# Patient Record
Sex: FEMALE
[De-identification: names, ages, dates, MRNs, and addresses within clinical notes are randomized per-mention and may not be internally consistent; named-entity substitution may affect disease eponyms.]

---

## 2018-05-09 ENCOUNTER — HOSPITAL ENCOUNTER (EMERGENCY)
Dept: HOSPITAL 14 - H.ER | Age: 43
Discharge: HOME | End: 2018-05-09
Payer: SELF-PAY

## 2018-05-09 VITALS
TEMPERATURE: 98 F | SYSTOLIC BLOOD PRESSURE: 118 MMHG | HEART RATE: 78 BPM | OXYGEN SATURATION: 100 % | DIASTOLIC BLOOD PRESSURE: 67 MMHG

## 2018-05-09 VITALS — RESPIRATION RATE: 18 BRPM

## 2018-05-09 DIAGNOSIS — K64.8: ICD-10-CM

## 2018-05-09 DIAGNOSIS — K59.00: Primary | ICD-10-CM

## 2018-05-09 NOTE — ED PDOC
HPI: General Adult


Time Seen by Provider: 05/09/18 14:14


Chief Complaint (Nursing): GI Problem


Chief Complaint (Provider): Rectal pain x 2 days 


History Per: Patient


History/Exam Limitations: no limitations


Onset/Duration Of Symptoms: Days


Have you had recent travel within the past 21 days to any of the following 

countries: [place], [place], [place] or [place]?: No


Current Symptoms Are (Timing): Still Present


Additional Complaint(s): 





44 yo female with history of constipation presents with rectal pain. PT states 

she had 2 BM in 10 days associated with abdominal discomfort. PT states both 

the constipation and abdominal discomfort is normal for her. PT states she 

comes to the ER for evaluation because of rectal pain. Pt states she feels 

burning in rectal area. No similar in the past. 





Past Medical History


Reviewed: Historical Data, Nursing Documentation, Vital Signs


Vital Signs: 





 Last Vital Signs











Temp  97.0 F L  05/09/18 14:02


 


Pulse  75   05/09/18 14:02


 


Resp  18   05/09/18 14:02


 


BP  107/70   05/09/18 14:02


 


Pulse Ox  99   05/09/18 14:02














- Medical History


PMH: No Chronic Diseases





- Surgical History


Surgical History: No Surg Hx





- Family History


Family History: States: No Known Family Hx





- Living Arrangements


Living Arrangements: With Family





- Home Medications


Home Medications: 


 Ambulatory Orders











 Medication  Instructions  Recorded


 


Docusate [Colace] 100 mg PO Q12H PRN #10 cap 05/09/18


 


Hydrocortisone 2.5% (Rectal) 30 applic MD BID #1 tube 05/09/18





[Anusol-HC]  














- Allergies


Allergies/Adverse Reactions: 


 Allergies











Allergy/AdvReac Type Severity Reaction Status Date / Time


 


No Known Allergies Allergy   Verified 05/09/18 14:02














Review of Systems


ROS Statement: Except As Marked, All Systems Reviewed And Found Negative


Constitutional: Negative for: Fever, Chills


Gastrointestinal: Positive for: Abdominal Pain, Constipation, Rectal Pain





Physical Exam





- Reviewed


Nursing Documentation Reviewed: Yes


Vital Signs Reviewed: Yes





- Physical Exam


Appears: Positive for: Well, Non-toxic, No Acute Distress


Head Exam: Positive for: ATRAUMATIC, NORMAL INSPECTION, NORMOCEPHALIC


Skin: Positive for: Normal Color, Warm, DRY


Eye Exam: Positive for: Normal appearance


ENT: Positive for: Normal ENT Inspection


Neck: Positive for: Normal, Painless ROM


Cardiovascular/Chest: Positive for: Regular Rate, Rhythm


Respiratory: Positive for: CNT, Normal Breath Sounds


Gastrointestinal/Abdominal: Positive for: Soft.  Negative for: Tenderness


Back: Positive for: Normal Inspection


Rectal: Positive for: Rectal Tone Is:, Hemorrhoids (External, flesh color, soft 

)


Extremity: Positive for: Normal ROM


Neurologic/Psych: Positive for: Alert, Oriented





- ECG


O2 Sat by Pulse Oximetry: 99





Medical Decision Making


Medical Decision Making: 





Discussed hydration with water. PT states she does not drink much water during 

the day normally. 





Disposition





- Clinical Impression


Clinical Impression: 


 External hemorrhoid








- Patient ED Disposition


Is Patient to be Admitted: No


Counseled Patient/Family Regarding: Diagnosis, Need For Followup, Rx Given





- Disposition


Disposition: Routine/Home


Disposition Time: 14:59


Condition: STABLE


Prescriptions: 


Docusate [Colace] 100 mg PO Q12H PRN #10 cap


 PRN Reason: Constipation


Hydrocortisone 2.5% (Rectal) [Anusol-HC] 30 applic MD BID #1 tube


Instructions:  Hemorrhoids


Forms:  [employer] (English)